# Patient Record
Sex: MALE | Race: WHITE | NOT HISPANIC OR LATINO | Employment: FULL TIME | URBAN - METROPOLITAN AREA
[De-identification: names, ages, dates, MRNs, and addresses within clinical notes are randomized per-mention and may not be internally consistent; named-entity substitution may affect disease eponyms.]

---

## 2017-01-15 ENCOUNTER — HOSPITAL ENCOUNTER (EMERGENCY)
Facility: MEDICAL CENTER | Age: 22
End: 2017-01-15
Attending: EMERGENCY MEDICINE
Payer: COMMERCIAL

## 2017-01-15 ENCOUNTER — APPOINTMENT (OUTPATIENT)
Dept: RADIOLOGY | Facility: MEDICAL CENTER | Age: 22
End: 2017-01-15
Attending: EMERGENCY MEDICINE
Payer: COMMERCIAL

## 2017-01-15 VITALS
RESPIRATION RATE: 18 BRPM | DIASTOLIC BLOOD PRESSURE: 58 MMHG | HEIGHT: 72 IN | TEMPERATURE: 98.5 F | OXYGEN SATURATION: 94 % | HEART RATE: 80 BPM | SYSTOLIC BLOOD PRESSURE: 139 MMHG | BODY MASS INDEX: 31.15 KG/M2 | WEIGHT: 230 LBS

## 2017-01-15 DIAGNOSIS — S83.005A PATELLAR DISLOCATION, LEFT, INITIAL ENCOUNTER: ICD-10-CM

## 2017-01-15 PROCEDURE — 73562 X-RAY EXAM OF KNEE 3: CPT | Mod: LT

## 2017-01-15 PROCEDURE — 99284 EMERGENCY DEPT VISIT MOD MDM: CPT

## 2017-01-15 ASSESSMENT — PAIN SCALES - GENERAL: PAINLEVEL_OUTOF10: 9

## 2017-01-15 NOTE — ED AVS SNAPSHOT
Snappy Chow Access Code: [email or web address]  Expires: 2/14/2017  8:17 PM    Snappy Chow  A secure, online tool to manage your health information     [employer]’s Snappy Chow® is a secure, online tool that connects you to your personalized health information from the privacy of your home -- day or night - making it very easy for you to manage your healthcare. Once the activation process is completed, you can even access your medical information using the Snappy Chow sylvia, which is available for free in the Apple Sylvia store or Google Play store.     Snappy Chow provides the following levels of access (as shown below):   My Chart Features   [facility] Primary Care Doctor [facility]  Specialists [facility]  Urgent  Care Non-[facility]  Primary Care  Doctor   Email your healthcare team securely and privately 24/7 X X X X   Manage appointments: schedule your next appointment; view details of past/upcoming appointments X      Request prescription refills. X      View recent personal medical records, including lab and immunizations X X X X   View health record, including health history, allergies, medications X X X X   Read reports about your outpatient visits, procedures, consult and ER notes X X X X   See your discharge summary, which is a recap of your hospital and/or ER visit that includes your diagnosis, lab results, and care plan. X X       How to register for Snappy Chow:  1. Go to  [email or web address].  2. Click on the Sign Up Now box, which takes you to the New Member Sign Up page. You will need to provide the following information:  a. Enter your Snappy Chow Access Code exactly as it appears at the top of this page. (You will not need to use this code after you’ve completed the sign-up process. If you do not sign up before the expiration date, you must request a new code.)   b. Enter your date of birth.   c. Enter your home email address.   d. Click Submit, and follow the next screen’s instructions.  3. Create a Snappy Chow ID. This will be your Snappy Chow  login ID and cannot be changed, so think of one that is secure and easy to remember.  4. Create a [employer] password. You can change your password at any time.  5. Enter your Password Reset Question and Answer. This can be used at a later time if you forget your password.   6. Enter your e-mail address. This allows you to receive e-mail notifications when new information is available in Geomagic.  7. Click Sign Up. You can now view your health information.    For assistance activating your Geomagic account, call [phone]

## 2017-01-15 NOTE — ED AVS SNAPSHOT
After Visit Summary                                                                                                                Behzad Iglesias Jr.   MRN: [id number]    Department:  [facility], Emergency Dept   Date of Visit:  1/15/2017            [facility], Emergency Dept    [address]    [place] [address]    Phone:  [phone]      You were seen by     Atiya Carrasco M.D.      Your Diagnosis Was     Patellar dislocation, left, initial encounter     S83.005A       Follow-up Information     1. Follow up with David Luevano M.D.. Schedule an appointment as soon as possible for a visit in 1 week.    Specialty:  Orthopaedics    Contact information    [address]  [address]  [place] [address] [phone]        Medication Information     Review all of your home medications and newly ordered medications with your primary doctor and/or pharmacist as soon as possible. Follow medication instructions as directed by your doctor and/or pharmacist.     Please keep your complete medication list with you and share with your physician. Update the information when medications are discontinued, doses are changed, or new medications (including over-the-counter products) are added; and carry medication information at all times in the event of emergency situations.               Medication List      Notice     You have not been prescribed any medications.            Procedures and tests performed during your visit     DX-KNEE 3 VIEWS LEFT        Discharge Instructions       Knee Bracing  Knee braces are supports to help stabilize and protect an injured or painful knee. They come in many different styles. They should support and protect the knee without increasing the chance of other injuries to yourself or others. It is important not to have a false sense of security when using a brace. Knee braces that help you to keep using your knee:  · Do not restore normal  knee stability under high stress forces.  · May decrease some aspects of athletic performance.  Some of the different types of knee braces are:  · Prophylactic knee braces are designed to prevent or reduce the severity of knee injuries during sports that make injury to the knee more likely.  · Rehabilitative knee braces are designed to allow protected motion of:  ¨ Injured knees.  ¨ Knees that have been treated with or without surgery.  There is no evidence that the use of a supportive knee brace protects the graft following a successful anterior cruciate ligament (ACL) reconstruction. However, braces are sometimes used to:   · Protect injured ligaments.  · Control knee movement during the initial healing period.  They may be used as part of the treatment program for the various injured ligaments or cartilage of the knee including the:  · Anterior cruciate ligament.  · Medial collateral ligament.  · Medial or lateral cartilage (meniscus).  · Posterior cruciate ligament.  · Lateral collateral ligament.  Rehabilitative knee braces are most commonly used:  · During crutch-assisted walking right after injury.  · During crutch-assisted walking right after surgery to repair the cartilage and/or cruciate ligament injury.  · For a short period of time, 2-8 weeks, after the injury or surgery.  The value of a rehabilitative brace as opposed to a cast or splint includes the:  · Ability to adjust the brace for swelling.  · Ability to remove the brace for examinations, icing, or showering.  · Ability to allow for movement in a controlled range of motion.  Functional knee braces give support to knees that have already been injured. They are designed to provide stability for the injured knee and provide protection after repair. Functional knee braces may not affect performance much. Lower extremity muscle strengthening, flexibility, and improvement in technique are more important than bracing in treating ligamentous knee injuries.  Functional braces are not a substitute for rehabilitation or surgical procedures.  /off- braces are designed to provide pain relief in arthritic knees. Patients with wear and tear arthritis from growing old or from an old cartilage injury (osteoarthritis) of the knee, and bowlegged (varus) or knock-knee (valgus) deformities, often develop increased pain in the arthritic side due to increased loading. /off- braces are made to reduce uneven loading in such knees. There is reduction in bowing out movement in bowlegged knees when the correct  brace is used. Patients with advanced osteoarthritis or severe varus or valgus alignment problems would not likely benefit from bracing.  Patellofemoral braces help the kneecap to move smoothly and well centered over the end of the femur in the knee.   Most people who wear knee braces feel that they help. However, there is a lack of scientific evidence that knee braces are helpful at the level needed for athletic participation to prevent injury. In spite of this, athletes report an increase in knee stability, pain relief, performance improvement, and confidence during athletics when using a brace.   Different knee problems require different knee braces:  · Your caregiver may suggest one kind of knee brace after knee surgery.  · A caregiver may choose another kind of knee brace for support instead of surgery for some types of torn ligaments.  · You may also need one for pain in the front of your knee that is not getting better with strengthening and flexibility exercises.  Get your caregiver's advice if you want to try a knee brace. The caregiver will advise you on where to get them and provide a prescription when it is needed to fashion and/or fit the brace.  Knee braces are the least important part of preventing knee injuries or getting better following injury. Stretching, strengthening and technique improvement are far more important in caring  for and preventing knee injuries. When strengthening your knee, increase your activities a little at a time so as not to develop injuries from overuse. Work out an exercise plan with your caregiver and/or physical therapist to get the best program for you. Do not let a knee brace become a crutch.  Always remember, there are no braces which support the knee as well as your original ligaments and cartilage you were born with. Conditioning, proper warm-up, and stretching remain the most important parts of keeping your knees healthy.  HOW TO USE A KNEE BRACE  · During sports, knee braces should be used as directed by your caregiver.  · Make sure that the hinges are where the knee bends.  · Straps, tapes, or hook-and-loop tapes should be fastened around your leg as instructed.  · You should check the placement of the brace during activities to make sure that it has not moved. Poorly positioned braces can hurt rather than help you.  · To work well, a knee brace should be worn during all activities that put you at risk of knee injury.  · Warm up properly before beginning athletic activities.  HOME CARE INSTRUCTIONS  · Knee braces often get damaged during normal use. Replace worn-out braces for maximum benefit.  · Clean regularly with soap and water.  · Inspect your brace often for wear and tear.  · Cover exposed metal to protect others from injury.  · Durable materials may cost more, but last longer.  SEEK IMMEDIATE MEDICAL CARE IF:   · Your knee seems to be getting worse rather than better.  · You have increasing pain or swelling in the knee.  · You have problems caused by the knee brace.  · You have increased swelling or inflammation (redness or soreness) in your knee.  · Your knee becomes warm and more painful and you develop an unexplained temperature over 101°F (38.3°C).  MAKE SURE YOU:   · Understand these instructions.  · Will watch your condition.  · Will get help right away if you are not doing well or get  worse.  See your caregiver, physical therapist, or orthopedic surgeon for additional information.     This information is not intended to replace advice given to you by your health care provider. Make sure you discuss any questions you have with your health care provider.     Document Released: 03/09/2005 Document Revised: 01/08/2016 Document Reviewed: 06/16/2010  [employer] Interactive Patient Education ©2016 [employer]            Patient Information     Patient Information    Following emergency treatment: all patient requiring follow-up care must return either to a private physician or a clinic if your condition worsens before you are able to obtain further medical attention, please return to the emergency room.     Billing Information    At [facility], we work to make the billing process streamlined for our patients.  Our Representatives are here to answer any questions you may have regarding your hospital bill.  If you have insurance coverage and have supplied your insurance information to us, we will submit a claim to your insurer on your behalf.  Should you have any questions regarding your bill, we can be reached online or by phone as follows:  Online: You are able pay your bills online or live chat with our representatives about any billing questions you may have. We are here to help Monday - Friday from 8:00am to 7:30pm and 9:00am - 12:00pm on Saturdays.  Please visit [email or web address]  for more information.   Phone:  [phone] or [phone]    Please note that your emergency physician, surgeon, pathologist, radiologist, anesthesiologist, and other specialists are not employed by [facility] and will therefore bill separately for their services.  Please contact them directly for any questions concerning their bills at the numbers below:     Emergency Physician Services:  [phone]  [facility]:  [phone]  [facility]:   [phone]  [facility]:  [phone]    1. Your final bill may vary from the amount quoted upon discharge if all procedures are not complete at that time, or if your doctor has additional procedures of which we are not aware. You will receive an additional bill if you return to the Emergency Department at [facility] for suture removal regardless of the facility of which the sutures were placed.     2. Please arrange for settlement of this account at the emergency registration.    3. All self-pay accounts are due in full at the time of treatment.  If you are unable to meet this obligation then payment is expected within 4-5 days.     4. If you have had radiology studies (CT, X-ray, Ultrasound, MRI), you have received a preliminary result during your emergency department visit. Please contact the radiology department [phone] to receive a copy of your final result. Please discuss the Final result with your primary physician or with the follow up physician provided.     Crisis Hotline:  [place] Crisis Hotline:  8-599-EJUZENY or 1-971.380.3434  [place] Crisis Hotline:    1-257.866.5302 or 106-911-7249         ED Discharge Follow Up Questions    1. In order to provide you with very good care, we would like to follow up with a phone call in the next few days.  May we have your permission to contact you?     YES /  NO    2. What is the best phone number to call you? (       )_____-__________    3. What is the best time to call you?      Morning  /  Afternoon  /  Evening                   Patient Signature:  ____________________________________________________________    Date:  ____________________________________________________________

## 2017-01-15 NOTE — ED AVS SNAPSHOT
1/15/2017          Behzad Iglesias Jr.  [address]  [place] [address]    Dear Behzad:    [facility] wants to ensure your discharge home is safe and you or your loved ones have had all your questions answered regarding your care after you leave the hospital.    You may receive a telephone call within two days of your discharge.  This call is to make certain you understand your discharge instructions as well as ensure we provided you with the best care possible during your stay with us.     The call will only last approximately 3-5 minutes and will be done by a nurse.    Once again, we want to ensure your discharge home is safe and that you have a clear understanding of any next steps in your care.  If you have any questions or concerns, please do not hesitate to contact us, we are here for you.  Thank you for choosing [facility] for your healthcare needs.    Sincerely,    Timmy Sommers    [facility]

## 2017-01-16 NOTE — DISCHARGE INSTRUCTIONS
Knee Bracing  Knee braces are supports to help stabilize and protect an injured or painful knee. They come in many different styles. They should support and protect the knee without increasing the chance of other injuries to yourself or others. It is important not to have a false sense of security when using a brace. Knee braces that help you to keep using your knee:  · Do not restore normal knee stability under high stress forces.  · May decrease some aspects of athletic performance.  Some of the different types of knee braces are:  · Prophylactic knee braces are designed to prevent or reduce the severity of knee injuries during sports that make injury to the knee more likely.  · Rehabilitative knee braces are designed to allow protected motion of:  ¨ Injured knees.  ¨ Knees that have been treated with or without surgery.  There is no evidence that the use of a supportive knee brace protects the graft following a successful anterior cruciate ligament (ACL) reconstruction. However, braces are sometimes used to:   · Protect injured ligaments.  · Control knee movement during the initial healing period.  They may be used as part of the treatment program for the various injured ligaments or cartilage of the knee including the:  · Anterior cruciate ligament.  · Medial collateral ligament.  · Medial or lateral cartilage (meniscus).  · Posterior cruciate ligament.  · Lateral collateral ligament.  Rehabilitative knee braces are most commonly used:  · During crutch-assisted walking right after injury.  · During crutch-assisted walking right after surgery to repair the cartilage and/or cruciate ligament injury.  · For a short period of time, 2-8 weeks, after the injury or surgery.  The value of a rehabilitative brace as opposed to a cast or splint includes the:  · Ability to adjust the brace for swelling.  · Ability to remove the brace for examinations, icing, or showering.  · Ability to allow for movement in a controlled  range of motion.  Functional knee braces give support to knees that have already been injured. They are designed to provide stability for the injured knee and provide protection after repair. Functional knee braces may not affect performance much. Lower extremity muscle strengthening, flexibility, and improvement in technique are more important than bracing in treating ligamentous knee injuries. Functional braces are not a substitute for rehabilitation or surgical procedures.  /off- braces are designed to provide pain relief in arthritic knees. Patients with wear and tear arthritis from growing old or from an old cartilage injury (osteoarthritis) of the knee, and bowlegged (varus) or knock-knee (valgus) deformities, often develop increased pain in the arthritic side due to increased loading. /off- braces are made to reduce uneven loading in such knees. There is reduction in bowing out movement in bowlegged knees when the correct  brace is used. Patients with advanced osteoarthritis or severe varus or valgus alignment problems would not likely benefit from bracing.  Patellofemoral braces help the kneecap to move smoothly and well centered over the end of the femur in the knee.   Most people who wear knee braces feel that they help. However, there is a lack of scientific evidence that knee braces are helpful at the level needed for athletic participation to prevent injury. In spite of this, athletes report an increase in knee stability, pain relief, performance improvement, and confidence during athletics when using a brace.   Different knee problems require different knee braces:  · Your caregiver may suggest one kind of knee brace after knee surgery.  · A caregiver may choose another kind of knee brace for support instead of surgery for some types of torn ligaments.  · You may also need one for pain in the front of your knee that is not getting better with strengthening and  flexibility exercises.  Get your caregiver's advice if you want to try a knee brace. The caregiver will advise you on where to get them and provide a prescription when it is needed to fashion and/or fit the brace.  Knee braces are the least important part of preventing knee injuries or getting better following injury. Stretching, strengthening and technique improvement are far more important in caring for and preventing knee injuries. When strengthening your knee, increase your activities a little at a time so as not to develop injuries from overuse. Work out an exercise plan with your caregiver and/or physical therapist to get the best program for you. Do not let a knee brace become a crutch.  Always remember, there are no braces which support the knee as well as your original ligaments and cartilage you were born with. Conditioning, proper warm-up, and stretching remain the most important parts of keeping your knees healthy.  HOW TO USE A KNEE BRACE  · During sports, knee braces should be used as directed by your caregiver.  · Make sure that the hinges are where the knee bends.  · Straps, tapes, or hook-and-loop tapes should be fastened around your leg as instructed.  · You should check the placement of the brace during activities to make sure that it has not moved. Poorly positioned braces can hurt rather than help you.  · To work well, a knee brace should be worn during all activities that put you at risk of knee injury.  · Warm up properly before beginning athletic activities.  HOME CARE INSTRUCTIONS  · Knee braces often get damaged during normal use. Replace worn-out braces for maximum benefit.  · Clean regularly with soap and water.  · Inspect your brace often for wear and tear.  · Cover exposed metal to protect others from injury.  · Durable materials may cost more, but last longer.  SEEK IMMEDIATE MEDICAL CARE IF:   · Your knee seems to be getting worse rather than better.  · You have increasing pain or  swelling in the knee.  · You have problems caused by the knee brace.  · You have increased swelling or inflammation (redness or soreness) in your knee.  · Your knee becomes warm and more painful and you develop an unexplained temperature over 101°F (38.3°C).  MAKE SURE YOU:   · Understand these instructions.  · Will watch your condition.  · Will get help right away if you are not doing well or get worse.  See your caregiver, physical therapist, or orthopedic surgeon for additional information.     This information is not intended to replace advice given to you by your health care provider. Make sure you discuss any questions you have with your health care provider.     Document Released: 03/09/2005 Document Revised: 01/08/2016 Document Reviewed: 06/16/2010  [employer] Interactive Patient Education ©2016 [employer]

## 2017-01-16 NOTE — ED PROVIDER NOTES
ED Provider Note    CHIEF COMPLAINT  Chief Complaint   Patient presents with   • Knee Pain       HPI  Behzad Iglesias Jr. is a 21 y.o. male who presents to the emergency room with the chief complaint of left knee pain. He states he has had a patellar dislocation in the past in his right knee. He was jumping into his truck tonight when he landed wrong and noticed his right patella was dislocated medially. He states he was able to reduce it on his own but is concerned that he may have injured it. He denies any numbness or weakness    REVIEW OF SYSTEMS  Positive for knee pain, Negative for swelling numbness or weakness  PAST MEDICAL HISTORY       SOCIAL HISTORY  Social History     Social History Main Topics   • Smoking status: Never Smoker    • Smokeless tobacco: Never Used   • Alcohol Use: No   • Drug Use: No   • Sexual Activity: No       SURGICAL HISTORY  patient denies any surgical history    CURRENT MEDICATIONS  Reviewed.  See Encounter Summary.  Include none    ALLERGIES  No Known Allergies    PHYSICAL EXAM  VITAL SIGNS: /81 mmHg  Pulse 108  Temp(Src) 36.9 °C (98.5 °F)  Resp 20  Ht 1.829 m (6')  Wt 104.327 kg (230 lb)  BMI 31.19 kg/m2  SpO2 98%  Constitutional: Pleasant, Alert in no apparent distress.  HENT: Normocephalic, Bilateral external ears normal. Nose normal.   Eyes: Pupils are equal and reactive. Conjunctiva normal, non-icteric.   Thorax & Lungs: Easy unlabored respirations  Abdomen:  No gross signs of peritonitis, no pain with movement   Skin: Visualized skin is  Dry, No erythema, No rash.   Extremities: Positive apprehension sign left knee, no gross effusion present skin is intact  Neurologic: Alert, Grossly non-focal.   Psychiatric: Affect and Mood normal      COURSE & MEDICAL DECISION MAKING  Nursing notes and vital signs were reviewed. (See chart for details)    The patient presents to the Emergency Department with a probable reduced left knee patellar dislocation.  X-ray was obtained  to rule out any effusion or fracture    DX-KNEE 3 VIEWS LEFT   Final Result      No acute findings.        X-ray shows no acute fracture or effusion    The patient was placed in a knee immobilizer I have recommended a follow-up with Ortho for PT evaluation. I have recommended using ice and anti-inflammatories for pain relief          The patient was discharged home with an information sheet on patellar dislocation and told to return immediately for any signs or symptoms listed, but specifically if numbness color change weakness, or any worsening at all.  The patient verbally agreed to the discharge precautions and follow-up plan which is documented in EPIC.    FINAL IMPRESSION  1. Left patellar dislocation  2.   3.             Electronically signed by: Atiya Carrasco, 1/15/2017 8:04 PM

## 2017-01-16 NOTE — ED NOTES
D/c pt home, with family. Pt aware of f/u instructions , aware to return for any changes or concerns. No further questions upon d/c home from ed

## 2018-07-17 ENCOUNTER — NON-PROVIDER VISIT (OUTPATIENT)
Dept: URGENT CARE | Facility: CLINIC | Age: 23
End: 2018-07-17

## 2018-07-17 DIAGNOSIS — Z02.1 PRE-EMPLOYMENT DRUG SCREENING: ICD-10-CM

## 2018-07-17 LAB
AMP AMPHETAMINE: NORMAL
BAR BARBITURATES: NORMAL
BZO BENZODIAZEPINES: NORMAL
COC COCAINE: NORMAL
INT CON NEG: NORMAL
INT CON POS: NORMAL
MDMA ECSTASY: NORMAL
MET METHAMPHETAMINES: NORMAL
MTD METHADONE: NORMAL
OPI OPIATES: NORMAL
OXY OXYCODONE: NORMAL
PCP PHENCYCLIDINE: NORMAL
POC URINE DRUG SCREEN OCDRS: NORMAL
THC: NORMAL

## 2018-07-17 PROCEDURE — 80305 DRUG TEST PRSMV DIR OPT OBS: CPT | Performed by: NURSE PRACTITIONER
